# Patient Record
Sex: MALE | Race: WHITE | NOT HISPANIC OR LATINO | URBAN - METROPOLITAN AREA
[De-identification: names, ages, dates, MRNs, and addresses within clinical notes are randomized per-mention and may not be internally consistent; named-entity substitution may affect disease eponyms.]

---

## 2021-11-12 ENCOUNTER — LAB REQUISITION (OUTPATIENT)
Dept: LAB | Facility: HOSPITAL | Age: 66
End: 2021-11-12

## 2021-11-12 DIAGNOSIS — M71.349 OTHER BURSAL CYST, UNSPECIFIED HAND: ICD-10-CM

## 2021-11-12 PROCEDURE — 88304 TISSUE EXAM BY PATHOLOGIST: CPT | Performed by: PLASTIC SURGERY

## 2021-11-15 LAB
CYTO UR: NORMAL
LAB AP CASE REPORT: NORMAL
LAB AP CLINICAL INFORMATION: NORMAL
PATH REPORT.FINAL DX SPEC: NORMAL
PATH REPORT.GROSS SPEC: NORMAL

## 2023-11-02 RX ORDER — TADALAFIL 5 MG/1
5 TABLET ORAL DAILY PRN
COMMUNITY

## 2023-11-12 ENCOUNTER — ANESTHESIA EVENT (OUTPATIENT)
Dept: PERIOP | Facility: HOSPITAL | Age: 68
End: 2023-11-12
Payer: COMMERCIAL

## 2023-11-12 NOTE — ANESTHESIA PREPROCEDURE EVALUATION
Anesthesia Evaluation     Patient summary reviewed and Nursing notes reviewed                Airway   Mallampati: I  TM distance: >3 FB  Neck ROM: full  No difficulty expected  Dental - normal exam     Pulmonary - negative pulmonary ROS and normal exam   Cardiovascular - negative cardio ROS and normal exam        Neuro/Psych- negative ROS  GI/Hepatic/Renal/Endo - negative ROS     Musculoskeletal (-) negative ROS    Abdominal  - normal exam    Bowel sounds: normal.   Substance History - negative use     OB/GYN negative ob/gyn ROS         Other        ROS/Med Hx Other: OUTSIDE LABS              Anesthesia Plan    ASA 2     general     intravenous induction     Pre-procedure education provided  Plan discussed with CRNA.    CODE STATUS:

## 2023-11-13 ENCOUNTER — HOSPITAL ENCOUNTER (OUTPATIENT)
Facility: HOSPITAL | Age: 68
Discharge: HOME | End: 2023-11-14
Attending: UROLOGY | Admitting: UROLOGY
Payer: COMMERCIAL

## 2023-11-13 ENCOUNTER — ANESTHESIA (OUTPATIENT)
Dept: PERIOP | Facility: HOSPITAL | Age: 68
End: 2023-11-13
Payer: COMMERCIAL

## 2023-11-13 DIAGNOSIS — N13.8 BENIGN PROSTATIC HYPERPLASIA WITH URINARY OBSTRUCTION: Primary | ICD-10-CM

## 2023-11-13 DIAGNOSIS — N40.1 BENIGN PROSTATIC HYPERPLASIA WITH URINARY OBSTRUCTION: Primary | ICD-10-CM

## 2023-11-13 PROBLEM — N40.0 BPH (BENIGN PROSTATIC HYPERPLASIA): Status: ACTIVE | Noted: 2023-11-13

## 2023-11-13 PROCEDURE — G0378 HOSPITAL OBSERVATION PER HR: HCPCS

## 2023-11-13 PROCEDURE — 25810000003 LACTATED RINGERS PER 1000 ML: Performed by: ANESTHESIOLOGY

## 2023-11-13 PROCEDURE — 25010000002 PROPOFOL 200 MG/20ML EMULSION: Performed by: NURSE ANESTHETIST, CERTIFIED REGISTERED

## 2023-11-13 PROCEDURE — 25810000003 SODIUM CHLORIDE 0.9 % SOLUTION: Performed by: UROLOGY

## 2023-11-13 PROCEDURE — 25010000002 SUGAMMADEX 200 MG/2ML SOLUTION: Performed by: NURSE ANESTHETIST, CERTIFIED REGISTERED

## 2023-11-13 PROCEDURE — 25010000002 CEFAZOLIN PER 500 MG: Performed by: UROLOGY

## 2023-11-13 PROCEDURE — 25010000002 HYDROMORPHONE 1 MG/ML SOLUTION: Performed by: NURSE ANESTHETIST, CERTIFIED REGISTERED

## 2023-11-13 PROCEDURE — 25010000002 CEFTRIAXONE PER 250 MG: Performed by: UROLOGY

## 2023-11-13 PROCEDURE — C2596 PROBE, ROBOTIC, WATER-JET: HCPCS | Performed by: UROLOGY

## 2023-11-13 PROCEDURE — 25010000002 FENTANYL CITRATE (PF) 100 MCG/2ML SOLUTION: Performed by: NURSE ANESTHETIST, CERTIFIED REGISTERED

## 2023-11-13 PROCEDURE — 25810000003 SODIUM CHLORIDE 0.9 % SOLUTION: Performed by: NURSE ANESTHETIST, CERTIFIED REGISTERED

## 2023-11-13 RX ORDER — DROPERIDOL 2.5 MG/ML
0.62 INJECTION, SOLUTION INTRAMUSCULAR; INTRAVENOUS ONCE AS NEEDED
Status: DISCONTINUED | OUTPATIENT
Start: 2023-11-13 | End: 2023-11-13 | Stop reason: HOSPADM

## 2023-11-13 RX ORDER — ACETAMINOPHEN 325 MG/1
650 TABLET ORAL ONCE AS NEEDED
Status: DISCONTINUED | OUTPATIENT
Start: 2023-11-13 | End: 2023-11-13 | Stop reason: HOSPADM

## 2023-11-13 RX ORDER — FENTANYL CITRATE 50 UG/ML
INJECTION, SOLUTION INTRAMUSCULAR; INTRAVENOUS AS NEEDED
Status: DISCONTINUED | OUTPATIENT
Start: 2023-11-13 | End: 2023-11-13 | Stop reason: SURG

## 2023-11-13 RX ORDER — SODIUM CHLORIDE 0.9 % (FLUSH) 0.9 %
10 SYRINGE (ML) INJECTION AS NEEDED
Status: DISCONTINUED | OUTPATIENT
Start: 2023-11-13 | End: 2023-11-13 | Stop reason: HOSPADM

## 2023-11-13 RX ORDER — FENTANYL CITRATE 50 UG/ML
50 INJECTION, SOLUTION INTRAMUSCULAR; INTRAVENOUS
Status: DISCONTINUED | OUTPATIENT
Start: 2023-11-13 | End: 2023-11-13 | Stop reason: HOSPADM

## 2023-11-13 RX ORDER — OXYCODONE HYDROCHLORIDE 5 MG/1
10 TABLET ORAL EVERY 4 HOURS PRN
Status: DISCONTINUED | OUTPATIENT
Start: 2023-11-13 | End: 2023-11-14 | Stop reason: HOSPADM

## 2023-11-13 RX ORDER — SODIUM CHLORIDE 9 MG/ML
INJECTION, SOLUTION INTRAVENOUS CONTINUOUS PRN
Status: DISCONTINUED | OUTPATIENT
Start: 2023-11-13 | End: 2023-11-13 | Stop reason: SURG

## 2023-11-13 RX ORDER — SODIUM CHLORIDE 9 MG/ML
100 INJECTION, SOLUTION INTRAVENOUS CONTINUOUS
Status: DISCONTINUED | OUTPATIENT
Start: 2023-11-13 | End: 2023-11-14 | Stop reason: HOSPADM

## 2023-11-13 RX ORDER — ACETAMINOPHEN 650 MG/1
650 SUPPOSITORY RECTAL EVERY 4 HOURS PRN
Status: DISCONTINUED | OUTPATIENT
Start: 2023-11-13 | End: 2023-11-14 | Stop reason: HOSPADM

## 2023-11-13 RX ORDER — ACETAMINOPHEN 325 MG/1
650 TABLET ORAL EVERY 4 HOURS PRN
Status: DISCONTINUED | OUTPATIENT
Start: 2023-11-13 | End: 2023-11-14 | Stop reason: HOSPADM

## 2023-11-13 RX ORDER — ROCURONIUM BROMIDE 10 MG/ML
INJECTION, SOLUTION INTRAVENOUS AS NEEDED
Status: DISCONTINUED | OUTPATIENT
Start: 2023-11-13 | End: 2023-11-13 | Stop reason: SURG

## 2023-11-13 RX ORDER — PROMETHAZINE HYDROCHLORIDE 25 MG/1
25 SUPPOSITORY RECTAL ONCE AS NEEDED
Status: DISCONTINUED | OUTPATIENT
Start: 2023-11-13 | End: 2023-11-13 | Stop reason: HOSPADM

## 2023-11-13 RX ORDER — SODIUM CHLORIDE, SODIUM LACTATE, POTASSIUM CHLORIDE, CALCIUM CHLORIDE 600; 310; 30; 20 MG/100ML; MG/100ML; MG/100ML; MG/100ML
1000 INJECTION, SOLUTION INTRAVENOUS CONTINUOUS
Status: DISCONTINUED | OUTPATIENT
Start: 2023-11-13 | End: 2023-11-14 | Stop reason: HOSPADM

## 2023-11-13 RX ORDER — ONDANSETRON 4 MG/1
4 TABLET, FILM COATED ORAL EVERY 6 HOURS PRN
Status: DISCONTINUED | OUTPATIENT
Start: 2023-11-13 | End: 2023-11-14 | Stop reason: HOSPADM

## 2023-11-13 RX ORDER — OXYCODONE HYDROCHLORIDE 5 MG/1
10 TABLET ORAL ONCE AS NEEDED
Status: DISCONTINUED | OUTPATIENT
Start: 2023-11-13 | End: 2023-11-13 | Stop reason: HOSPADM

## 2023-11-13 RX ORDER — LIDOCAINE HYDROCHLORIDE 10 MG/ML
0.5 INJECTION, SOLUTION INFILTRATION; PERINEURAL ONCE AS NEEDED
Status: DISCONTINUED | OUTPATIENT
Start: 2023-11-13 | End: 2023-11-13 | Stop reason: HOSPADM

## 2023-11-13 RX ORDER — LIDOCAINE HYDROCHLORIDE 20 MG/ML
INJECTION, SOLUTION EPIDURAL; INFILTRATION; INTRACAUDAL; PERINEURAL AS NEEDED
Status: DISCONTINUED | OUTPATIENT
Start: 2023-11-13 | End: 2023-11-13 | Stop reason: SURG

## 2023-11-13 RX ORDER — ONDANSETRON 2 MG/ML
4 INJECTION INTRAMUSCULAR; INTRAVENOUS EVERY 6 HOURS PRN
Status: DISCONTINUED | OUTPATIENT
Start: 2023-11-13 | End: 2023-11-14 | Stop reason: HOSPADM

## 2023-11-13 RX ORDER — NALOXONE HCL 0.4 MG/ML
0.4 VIAL (ML) INJECTION
Status: DISCONTINUED | OUTPATIENT
Start: 2023-11-13 | End: 2023-11-14 | Stop reason: HOSPADM

## 2023-11-13 RX ORDER — PROPOFOL 10 MG/ML
INJECTION, EMULSION INTRAVENOUS AS NEEDED
Status: DISCONTINUED | OUTPATIENT
Start: 2023-11-13 | End: 2023-11-13 | Stop reason: SURG

## 2023-11-13 RX ORDER — ACETAMINOPHEN 650 MG/1
650 SUPPOSITORY RECTAL EVERY 4 HOURS PRN
Status: DISCONTINUED | OUTPATIENT
Start: 2023-11-13 | End: 2023-11-13 | Stop reason: HOSPADM

## 2023-11-13 RX ORDER — PROMETHAZINE HYDROCHLORIDE 25 MG/1
25 TABLET ORAL ONCE AS NEEDED
Status: DISCONTINUED | OUTPATIENT
Start: 2023-11-13 | End: 2023-11-13 | Stop reason: HOSPADM

## 2023-11-13 RX ADMIN — SODIUM CHLORIDE: 9 INJECTION, SOLUTION INTRAVENOUS at 08:45

## 2023-11-13 RX ADMIN — SODIUM CHLORIDE 100 ML/HR: 9 INJECTION, SOLUTION INTRAVENOUS at 22:14

## 2023-11-13 RX ADMIN — ROCURONIUM BROMIDE 50 MG: 10 INJECTION, SOLUTION INTRAVENOUS at 08:49

## 2023-11-13 RX ADMIN — HYDROMORPHONE HYDROCHLORIDE 0.25 MG: 1 INJECTION, SOLUTION INTRAMUSCULAR; INTRAVENOUS; SUBCUTANEOUS at 09:52

## 2023-11-13 RX ADMIN — SODIUM CHLORIDE, POTASSIUM CHLORIDE, SODIUM LACTATE AND CALCIUM CHLORIDE 1000 ML: 600; 310; 30; 20 INJECTION, SOLUTION INTRAVENOUS at 07:45

## 2023-11-13 RX ADMIN — CEFTRIAXONE 1000 MG: 1 INJECTION, POWDER, FOR SOLUTION INTRAMUSCULAR; INTRAVENOUS at 16:42

## 2023-11-13 RX ADMIN — LIDOCAINE HYDROCHLORIDE 100 MG: 20 INJECTION, SOLUTION EPIDURAL; INFILTRATION; INTRACAUDAL; PERINEURAL at 08:49

## 2023-11-13 RX ADMIN — SUGAMMADEX 200 MG: 100 INJECTION, SOLUTION INTRAVENOUS at 09:30

## 2023-11-13 RX ADMIN — FENTANYL CITRATE 50 MCG: 50 INJECTION, SOLUTION INTRAMUSCULAR; INTRAVENOUS at 08:49

## 2023-11-13 RX ADMIN — ACETAMINOPHEN 650 MG: 325 TABLET, FILM COATED ORAL at 19:22

## 2023-11-13 RX ADMIN — CEFAZOLIN 1000 MG: 1 INJECTION, POWDER, FOR SOLUTION INTRAMUSCULAR; INTRAVENOUS at 08:55

## 2023-11-13 RX ADMIN — PROPOFOL 200 MG: 10 INJECTION, EMULSION INTRAVENOUS at 08:49

## 2023-11-13 NOTE — PLAN OF CARE
Goal Outcome Evaluation:            Pt being admitted overnight for CBI following aquablation of prostate.

## 2023-11-13 NOTE — ANESTHESIA PROCEDURE NOTES
Airway  Date/Time: 11/13/2023 8:52 AM  Airway not difficult    General Information and Staff    Patient location during procedure: OR  CRNA/CAA: Carline Galvin CRNA    Indications and Patient Condition  Indications for airway management: airway protection    Preoxygenated: yes  Mask difficulty assessment: 1 - vent by mask    Final Airway Details  Final airway type: endotracheal airway      Successful airway: ETT  Cuffed: yes   Successful intubation technique: direct laryngoscopy  Facilitating devices/methods: intubating stylet  Endotracheal tube insertion site: oral  Blade: Sharma  Blade size: 2  ETT size (mm): 7.5  Cormack-Lehane Classification: grade I - full view of glottis  Placement verified by: chest auscultation and capnometry   Cuff volume (mL): 8  Measured from: lips  ETT/EBT  to lips (cm): 23  Number of attempts at approach: 1

## 2023-11-13 NOTE — ANESTHESIA POSTPROCEDURE EVALUATION
Patient: Gary Tom    Procedure Summary       Date: 11/13/23 Room / Location: [facility] OR 06 / [facility] MAIN OR    Anesthesia Start: 0845 Anesthesia Stop: 0941    Procedure: TRANSURETHRAL PROSTATE AQUABLATION Diagnosis:       Benign localized prostatic hyperplasia with lower urinary tract symptoms (LUTS)      (Benign localized prostatic hyperplasia with lower urinary tract symptoms (LUTS) [N40.1])    Surgeons: Ramon Wilson MD Provider: Jamar Sharma MD    Anesthesia Type: general ASA Status: 2            Anesthesia Type: general    Vitals  Vitals Value Taken Time   /66 11/13/23 1530   Temp 98.7 °F (37.1 °C) 11/13/23 1515   Pulse 63 11/13/23 1536   Resp 15 11/13/23 1515   SpO2 99 % 11/13/23 1536   Vitals shown include unfiled device data.        Post Anesthesia Care and Evaluation    Patient location during evaluation: PACU  Patient participation: complete - patient participated  Level of consciousness: awake  Pain scale: See nurse's notes for pain score.  Pain management: adequate    Airway patency: patent  Anesthetic complications: No anesthetic complications  PONV Status: none  Cardiovascular status: acceptable  Respiratory status: acceptable and spontaneous ventilation  Hydration status: acceptable    Comments: Patient seen and examined postoperatively; vital signs stable; SpO2 greater than or equal to 90%; cardiopulmonary status stable; nausea/vomiting adequately controlled; pain adequately controlled; no apparent anesthesia complications; patient discharged from anesthesia care when discharge criteria were met

## 2023-11-13 NOTE — H&P
"Urology History and Physical    Patient Identification:  Name:  Gary Tom  Age:  68 y.o.  Sex:  male  :  1955  MRN:  [id number]    Chief Complaint:  Here for the water thing    History of Present Illness:    67 yo with BPH   Presents for aquablation  No blood thinners  and took preop abx    Problem List:  There is no problem list on file for this patient.    Past Medical History:  History reviewed. No pertinent past medical history.  Past Surgical History:  Past Surgical History:   Procedure Laterality Date    BLADDER SUSPENSION       Home Meds:  Medications Prior to Admission   Medication Sig Dispense Refill Last Dose    tadalafil (Cialis) 5 MG tablet Take 1 tablet by mouth Daily As Needed for Erectile Dysfunction.       traMADol (ULTRAM) 50 MG tablet Take 1 tablet by mouth Every 6 (Six) Hours As Needed for pain 15 tablet 0      Current Meds:    Current Facility-Administered Medications:     ceFAZolin 1000 mg IVPB in 100 mL NS (MBP), 1,000 mg, Intravenous, Once, Ramon Wilson MD    lactated ringers infusion 1,000 mL, 1,000 mL, Intravenous, Continuous, Eliazar Mcdonough MD    lidocaine (XYLOCAINE) 1 % injection 0.5 mL, 0.5 mL, Intradermal, Once PRN, Eliazar Mcdonough MD    sodium chloride 0.9 % flush 10 mL, 10 mL, Intravenous, PRN, Eliazar Mcdonough MD  Allergies:  Patient has no known allergies.    Review of Systems:  Negative 12 point system review except: none    Objective:  tMax 24 hours:  Temp (24hrs), Av.5 °F (36.9 °C), Min:98.5 °F (36.9 °C), Max:98.5 °F (36.9 °C)    Vital Sign Ranges:  Temp:  [98.5 °F (36.9 °C)] 98.5 °F (36.9 °C)  Heart Rate:  [51] 51  Resp:  [10] 10  BP: (135)/(70) 135/70  Intake and Output Last 3 Shifts:  No intake/output data recorded.    Exam:  /70 (BP Location: Left arm, Patient Position: Lying)   Pulse 51   Temp 98.5 °F (36.9 °C) (Oral)   Resp 10   Ht 188 cm (74\")   Wt 96.2 kg (212 lb)   SpO2 97%   BMI 27.22 kg/m²    General Appearance:    " Alert, cooperative, no acute distress, general       appearance is normal   Head:    Normocephalic, without obvious abnormality, atraumatic   Eyes:            Pupils/Irises normal. Exterior inspection conjunctivae       and lids normal.   Ears:    Normal external inspection   Nose:   Exterior inspection of nose is normal   Throat:   Lips, mucosa, and tongue normal   Neck:   No adenopathy, supple, symmetrical, trachea midline   Back:     No CVA tenderness   Lungs:     Respirations unlabored; normal effort, no audible       abnormality   CV:   Regular rhythm and normal rate, no edema   Abdomen:     No hernia, examination of the abdomen is normal with     no  masses, tenderness, or distension    Male :  normal    Musculoskeletal:   Inspection of the nails and digits reveal no abnormalities   Skin:   Inspection normal, palpation normal   Neurologic/Psych:   Orientation normal; Mood/Affect normal     Data Review:  All labs (24hrs):    Lab Results (last 24 hours)       ** No results found for the last 24 hours. **          Radiology:   Imaging Results (Last 72 Hours)       ** No results found for the last 72 hours. **            Assessment:  Active Problems:    * No active hospital problems. *    BPH w/ LUTS    Proceed with Aquablationk    Ramon Wilson MD  11/13/2023  07:32 EST

## 2023-11-13 NOTE — OP NOTE
Date of Procedure: November 12, 2023  Preoperative Diagnosis: BPH w/ LUTS  Postoperative Diagnosis: same  Procedures Performed: Aquablation of Prostate  Surgeon: Katie  Intraoperative Findings: 50 grams; 5:20 of ablation time with 2 passes; procedure time 26 min  Anesthesia: General  Specimens sent to Pathology: none  EBL: 5 mL  Drains: 24 Fr Rousch augustine  Complications: none     Indications/Brief History:  See my H&P     Description of Procedure:  The patient was brought to the operating room and a proper timeout was completed.  I confirmed that IV antibiotics were given.  The patient then underwent general anesthesia without complications.  He was placed in the lithotomy position.    I injected KY jelly into the rectum the placed the TRUS probe into the patient.  Once the prostate was visualized, I secured the TRUS probe to the TRUS Stepper which had previously been mounted to the TRUS Articulating Arm.  The ultrasound probe was then used to center and align the prostate in both the transverse and sagittal views.  The prostate dimensions of height, width, and length were used to measure the total volume of the prostate, which was 50 grams and had defects and somewhat of a contourted prostate from his previous urolift.   The 22 Vietnamese Aquabeam Handpiece was then inserted per the urethra into the bladder.  The scope was then used to inspect the bladder, prostate, verumontanum, and external sphincter.  The Aquabeam handpiece was then secured to the Handpiece Articulating Arm.  I made sure the Aquabeam Handpiece and TRUS Probe were aligned, parallel, and colinear.  I confirmed the Aquabeam nozzle was centered and anterior of the posterior prostatic tissue.  The cystoscope was then retracted to visualize the verumontanum and external sphincter, and the cystoscope tip was positioned just proximal to the external sphincter.  I reconfirmed alignment of the TRUS probe with the Aquabeam Handpiece and compression was  applied with the TRUS probe.    Horizontal alignment of the Handpiece waterjet nozzle was then peformed.  The Aquablation treatment zones were then planned utilizing real-time TRUS to visualize the contour of the prostate.  The depth and radial angles of ablation were defined in the transverse view.  In the sagittal view, the Aquabeam nozzle was then identified, aligned, and registered with the software.  The treatment contours were then adjusted to conform to the intended ablation margins.  The starting point, median lobe, bladder neck, mid-prostate, and verumontanum were marked and confirmed in the treatment contour.     The Aquablation treatment was then started following the resection contour confirmed under ultrasound guidance.  We made sure to use low energy and adjusted depths accordingly over bladder neck  The total Aquablation resection time was 5:20 with two passes.  Once Aquablation resection was complete, the handpiece was removed from the patient.  I placed a 28 [place] resectoscope in the bladder with the aid of the visual obturator.  Tissue/Clot evacuation was then performed.  Limited resection of ablated tissue at the bladder neck was performed as well as we had to remove the urolift clips.  I saw one in each lateral lobe and two in the lateral portions of his anterior prostatic tissue.  This was followed by selective cautery for hemostasis.  I made sure the ureteral orifices were identified and uninjured.  A 24 [place] Rousch catheter was placed and 60 mL of water was placed in the balloon.  Bladder irrigation with NS was then began and traction was applied.  The ultrasound probe was removed, the patient was brought out of lithotomy position, and was then transferred to the recovery room in a stable condition.     Time from insertion of TRUS probe to insertion of the augustine was 26 minutes

## 2023-11-14 VITALS
SYSTOLIC BLOOD PRESSURE: 116 MMHG | TEMPERATURE: 97.5 F | HEIGHT: 74 IN | BODY MASS INDEX: 27.84 KG/M2 | RESPIRATION RATE: 14 BRPM | HEART RATE: 50 BPM | OXYGEN SATURATION: 97 % | WEIGHT: 216.93 LBS | DIASTOLIC BLOOD PRESSURE: 56 MMHG

## 2023-11-14 LAB
ANION GAP SERPL CALCULATED.3IONS-SCNC: 9 MMOL/L (ref 5–15)
BASOPHILS # BLD AUTO: 0 10*3/MM3 (ref 0–0.2)
BASOPHILS NFR BLD AUTO: 0.3 % (ref 0–1.5)
BUN SERPL-MCNC: 12 MG/DL (ref 8–23)
BUN/CREAT SERPL: 13 (ref 7–25)
CALCIUM SPEC-SCNC: 8.6 MG/DL (ref 8.6–10.5)
CHLORIDE SERPL-SCNC: 111 MMOL/L (ref 98–107)
CO2 SERPL-SCNC: 24 MMOL/L (ref 22–29)
CREAT SERPL-MCNC: 0.92 MG/DL (ref 0.76–1.27)
DEPRECATED RDW RBC AUTO: 43.8 FL (ref 37–54)
EGFRCR SERPLBLD CKD-EPI 2021: 90.6 ML/MIN/1.73
EOSINOPHIL # BLD AUTO: 0 10*3/MM3 (ref 0–0.4)
EOSINOPHIL NFR BLD AUTO: 0.3 % (ref 0.3–6.2)
ERYTHROCYTE [DISTWIDTH] IN BLOOD BY AUTOMATED COUNT: 13.2 % (ref 12.3–15.4)
GLUCOSE SERPL-MCNC: 106 MG/DL (ref 65–99)
HCT VFR BLD AUTO: 43.1 % (ref 37.5–51)
HGB BLD-MCNC: 14.6 G/DL (ref 13–17.7)
LYMPHOCYTES # BLD AUTO: 1.5 10*3/MM3 (ref 0.7–3.1)
LYMPHOCYTES NFR BLD AUTO: 11.8 % (ref 19.6–45.3)
MCH RBC QN AUTO: 30.7 PG (ref 26.6–33)
MCHC RBC AUTO-ENTMCNC: 33.9 G/DL (ref 31.5–35.7)
MCV RBC AUTO: 90.6 FL (ref 79–97)
MONOCYTES # BLD AUTO: 1 10*3/MM3 (ref 0.1–0.9)
MONOCYTES NFR BLD AUTO: 7.5 % (ref 5–12)
NEUTROPHILS NFR BLD AUTO: 10.3 10*3/MM3 (ref 1.7–7)
NEUTROPHILS NFR BLD AUTO: 80.1 % (ref 42.7–76)
NRBC BLD AUTO-RTO: 0.1 /100 WBC (ref 0–0.2)
PLATELET # BLD AUTO: 218 10*3/MM3 (ref 140–450)
PMV BLD AUTO: 8.2 FL (ref 6–12)
POTASSIUM SERPL-SCNC: 4.4 MMOL/L (ref 3.5–5.2)
RBC # BLD AUTO: 4.75 10*6/MM3 (ref 4.14–5.8)
SODIUM SERPL-SCNC: 144 MMOL/L (ref 136–145)
WBC NRBC COR # BLD: 12.8 10*3/MM3 (ref 3.4–10.8)

## 2023-11-14 PROCEDURE — 25810000003 SODIUM CHLORIDE 0.9 % SOLUTION: Performed by: UROLOGY

## 2023-11-14 PROCEDURE — 80048 BASIC METABOLIC PNL TOTAL CA: CPT | Performed by: UROLOGY

## 2023-11-14 PROCEDURE — 85025 COMPLETE CBC W/AUTO DIFF WBC: CPT | Performed by: UROLOGY

## 2023-11-14 RX ORDER — PHENAZOPYRIDINE HYDROCHLORIDE 100 MG/1
100 TABLET, FILM COATED ORAL 3 TIMES DAILY PRN
Qty: 15 TABLET | Refills: 0 | Status: SHIPPED | OUTPATIENT
Start: 2023-11-14

## 2023-11-14 RX ORDER — ONDANSETRON 4 MG/1
4 TABLET, ORALLY DISINTEGRATING ORAL EVERY 8 HOURS PRN
Qty: 15 TABLET | Refills: 1 | Status: SHIPPED | OUTPATIENT
Start: 2023-11-14

## 2023-11-14 RX ORDER — CEPHALEXIN 500 MG/1
500 CAPSULE ORAL 2 TIMES DAILY
Qty: 10 CAPSULE | Refills: 0 | Status: SHIPPED | OUTPATIENT
Start: 2023-11-14 | End: 2023-11-19

## 2023-11-14 RX ORDER — TRAMADOL HYDROCHLORIDE 50 MG/1
50 TABLET ORAL EVERY 6 HOURS PRN
Qty: 20 TABLET | Refills: 0 | Status: SHIPPED | OUTPATIENT
Start: 2023-11-14

## 2023-11-14 RX ORDER — DOCUSATE SODIUM 100 MG/1
100 CAPSULE, LIQUID FILLED ORAL 2 TIMES DAILY PRN
Qty: 30 CAPSULE | Refills: 1 | Status: SHIPPED | OUTPATIENT
Start: 2023-11-14

## 2023-11-14 RX ADMIN — SODIUM CHLORIDE 100 ML/HR: 9 INJECTION, SOLUTION INTRAVENOUS at 07:25

## 2023-11-14 NOTE — DISCHARGE SUMMARY
DISCHARGE SUMMARY    Sigifredo Maria MD      Date of Discharge:  11/14/2023    Discharge Diagnosis:   BPH with obstruction    Problem List:    BPH (benign prostatic hyperplasia)      Presenting Problem/History of Present Illness  Benign localized prostatic hyperplasia with lower urinary tract symptoms (LUTS) [N40.1]  BPH (benign prostatic hyperplasia) [N40.0]        Hospital Course  Patient is a 68 y.o. male admitted after aqua ablation, urine cleared up overnight he was given a voiding trial and discharged postoperative day 1    Procedures Performed  Procedure(s):  TRANSURETHRAL PROSTATE AQUABLATION         Pertinent Test Results: None    Condition on Discharge:  stable      Discharge Disposition: Home      Discharge Medications     Discharge Medications        New Medications        Instructions Start Date   cephalexin 500 MG capsule  Commonly known as: KEFLEX   500 mg, Oral, 2 Times Daily      docusate sodium 100 MG capsule  Commonly known as: Colace   100 mg, Oral, 2 Times Daily PRN      ondansetron ODT 4 MG disintegrating tablet  Commonly known as: ZOFRAN-ODT   4 mg, Translingual, Every 8 Hours PRN      phenazopyridine 100 MG tablet  Commonly known as: Pyridium   100 mg, Oral, 3 Times Daily PRN      traMADol 50 MG tablet  Commonly known as: ULTRAM   50 mg, Oral, Every 6 Hours PRN             Continue These Medications        Instructions Start Date   Cialis 5 MG tablet  Generic drug: tadalafil   5 mg, Oral, Daily PRN                 Follow-up Appointments  No future appointments.  Additional Instructions for the Follow-ups that You Need to Schedule       Basic Metabolic Panel   Oct 30, 2023      Release to patient: Routine Release        CBC (No Diff)   Oct 30, 2023      Release to patient: Routine Release               Follow-up next week with Dr. Wilson

## 2023-11-14 NOTE — PLAN OF CARE
Problem: Adult Inpatient Plan of Care  Goal: Absence of Hospital-Acquired Illness or Injury  Intervention: Identify and Manage Fall Risk  Recent Flowsheet Documentation  Taken 11/14/2023 0400 by Malik Swanson RN  Safety Promotion/Fall Prevention:   clutter free environment maintained   assistive device/personal items within reach   nonskid shoes/slippers when out of bed   room organization consistent   safety round/check completed  Taken 11/14/2023 0200 by Malik Swanson RN  Safety Promotion/Fall Prevention:   assistive device/personal items within reach   clutter free environment maintained   nonskid shoes/slippers when out of bed   room organization consistent   safety round/check completed  Taken 11/14/2023 0000 by Malik Swanson RN  Safety Promotion/Fall Prevention:   assistive device/personal items within reach   clutter free environment maintained   nonskid shoes/slippers when out of bed   room organization consistent   safety round/check completed  Taken 11/13/2023 2000 by Malik Swanson RN  Safety Promotion/Fall Prevention:   assistive device/personal items within reach   clutter free environment maintained   nonskid shoes/slippers when out of bed   room organization consistent   safety round/check completed  Intervention: Prevent Skin Injury  Recent Flowsheet Documentation  Taken 11/14/2023 0400 by Malik Swanson RN  Body Position: position changed independently  Skin Protection:   adhesive use limited   tubing/devices free from skin contact  Taken 11/14/2023 0000 by Malik Swanson RN  Body Position: position changed independently  Skin Protection:   adhesive use limited   tubing/devices free from skin contact  Taken 11/13/2023 2000 by Malik Swanson RN  Body Position: position changed independently  Intervention: Prevent and Manage VTE (Venous Thromboembolism) Risk  Recent Flowsheet Documentation  Taken 11/13/2023 2000 by Malik Swanson RN  VTE Prevention/Management: sequential  compression devices off     Problem: Adult Inpatient Plan of Care  Goal: Absence of Hospital-Acquired Illness or Injury  Intervention: Prevent Skin Injury  Recent Flowsheet Documentation  Taken 11/14/2023 0400 by Malik Swanson RN  Body Position: position changed independently  Skin Protection:   adhesive use limited   tubing/devices free from skin contact  Taken 11/14/2023 0000 by Malik Swanson RN  Body Position: position changed independently  Skin Protection:   adhesive use limited   tubing/devices free from skin contact  Taken 11/13/2023 2000 by Malik Swanson RN  Body Position: position changed independently     Problem: Pain Acute  Goal: Acceptable Pain Control and Functional Ability  Intervention: Prevent or Manage Pain  Recent Flowsheet Documentation  Taken 11/14/2023 0400 by Malik Swanson RN  Medication Review/Management: medications reviewed  Taken 11/13/2023 2000 by Malik Swanson RN  Medication Review/Management: medications reviewed  Intervention: Optimize Psychosocial Wellbeing  Recent Flowsheet Documentation  Taken 11/13/2023 2000 by Malik Swanson RN  Diversional Activities:   television   smartphone     Problem: Pain Acute  Goal: Acceptable Pain Control and Functional Ability  Intervention: Optimize Psychosocial Wellbeing  Recent Flowsheet Documentation  Taken 11/13/2023 2000 by Malik Swanson RN  Diversional Activities:   television   smartphone   Goal Outcome Evaluation:      Patient has been pleasant, calm, and cooperative. Patient slept in between care.Patient has been draining light pink drainage from continuous bladder irrigation.                   no

## 2023-11-15 NOTE — CASE MANAGEMENT/SOCIAL WORK
Continued Stay Note  RODRIGO Mercedes     Patient Name: Gary Tom  MRN: [id number]  Today's Date: 11/15/2023    Admit Date: 11/13/2023        Discharge Plan       Row Name 11/15/23 1604       Plan    Plan Comments D/C Prior to CM assessment    Final Discharge Disposition Code 01 - home or self-care               Expected Discharge Date and Time       Expected Discharge Date Expected Discharge Time    Nov 14, 2023               Kimberlyn Lawler RN

## (undated) DEVICE — KT SURG TURNOVER 050

## (undated) DEVICE — SLV SCD CALF HEMOFORCE DVT THERP REPROC MD

## (undated) DEVICE — SYRINGE,TOOMEY,IRRIGATION,70CC,STERILE: Brand: MEDLINE

## (undated) DEVICE — THE LITHOTOMY DRAPE (SEE FIGURE 1) IS A STERILE SINGLE USE DEVICE THAT SERVES AS A STERILE BARRIER FOR THE PATIENT AND ISOLATES THE SURGICAL SITE. THE LITHOTOMY DRAPE ALSO PROVIDES A STERILE BARRIER BETWEEN TRANSURETHRAL AND RECTAL INSTRUMENTS, WHILE ALSO MANAGING FLUID COLLECTION. THE LITHOTOMY DRAPE ALLOWS THE PHYSICIAN TO CONDUCT AN AQUABLATION® OR OTHER TRANSURETHRAL OR TRANSRECTAL UROLOGICAL PROCEDURES AND REDUCE THE RISK OF BREACHING STERILITY. THE LITHOTOMY DRAPE, MEASURING 105 INCHES BY 53 INCHES, IS MADE OF A NON-WOVEN SMS TEXTILE, TRANSPARENT POLY SHEETING, AND A FLUID POUCH WITH A MESH SCREEN.: Brand: LITHOTOMY DRAPE

## (undated) DEVICE — PK DRP AQUABEAM LITHO 65X69IN

## (undated) DEVICE — COVER,MAYO STAND,STERILE: Brand: MEDLINE

## (undated) DEVICE — SOLUTION,WATER,IRRIGATION,1000ML,STERILE: Brand: MEDLINE

## (undated) DEVICE — GLV SURG SIGNATURE ESSENTIAL PF LTX SZ7

## (undated) DEVICE — Device: Brand: AQUABEAM HANDPIECE

## (undated) DEVICE — LP ELECTRD BRN

## (undated) DEVICE — PAD, GROUNDING, UNIVERSAL, SPLIT, 9': Brand: MEDLINE

## (undated) DEVICE — PK CYSTO 50

## (undated) DEVICE — CATH FOL SIMPLASTIC 3WY 24F 30CC

## (undated) DEVICE — BAG,DRAINAGE,4L,A/R TOWER,LL,SLIDE TAP: Brand: MEDLINE

## (undated) DEVICE — SOL IRRIG SOD CHL 0.9PCT 3000ML

## (undated) DEVICE — SOL IRR SORBITOL 3PCT BG 3000ML

## (undated) DEVICE — TUBING, SUCTION, 1/4" X 12', STRAIGHT: Brand: MEDLINE